# Patient Record
Sex: FEMALE | Race: BLACK OR AFRICAN AMERICAN | NOT HISPANIC OR LATINO | URBAN - METROPOLITAN AREA
[De-identification: names, ages, dates, MRNs, and addresses within clinical notes are randomized per-mention and may not be internally consistent; named-entity substitution may affect disease eponyms.]

---

## 2018-03-08 ENCOUNTER — OFFICE (OUTPATIENT)
Dept: URBAN - METROPOLITAN AREA CLINIC 75 | Facility: CLINIC | Age: 53
End: 2018-03-08
Payer: COMMERCIAL

## 2018-03-08 VITALS
DIASTOLIC BLOOD PRESSURE: 72 MMHG | HEART RATE: 78 BPM | WEIGHT: 121 LBS | HEIGHT: 68 IN | SYSTOLIC BLOOD PRESSURE: 100 MMHG

## 2018-03-08 DIAGNOSIS — Z12.11 ENCOUNTER FOR SCREENING FOR MALIGNANT NEOPLASM OF COLON: ICD-10-CM

## 2018-03-08 DIAGNOSIS — R63.0 ANOREXIA: ICD-10-CM

## 2018-03-08 DIAGNOSIS — R63.4 ABNORMAL WEIGHT LOSS: ICD-10-CM

## 2018-03-08 DIAGNOSIS — R10.13 EPIGASTRIC PAIN: ICD-10-CM

## 2018-03-08 DIAGNOSIS — K59.00 CONSTIPATION, UNSPECIFIED: ICD-10-CM

## 2018-03-08 PROCEDURE — 99204 OFFICE O/P NEW MOD 45 MIN: CPT | Performed by: INTERNAL MEDICINE

## 2018-03-08 NOTE — SERVICENOTES
I did review her records but I do not have her recent blood work.  According to records at one point she was empirically treated for strongyloides.

## 2018-03-08 NOTE — SERVICEHPINOTES
Thank you very much for allowing me to evaluate Ms. Sanders. As you know she is a 53-year-old refugee from [place]. The information is through an . She's had abdominal pain for at least 5 years. She'll have postprandial nausea and vomiting intermittently. She has no appetite, she "forces herself to eat to survive". She or have pain postprandially at times of the time she will have pain. She specifically says she'll have pain every time she drinks beer. She's had no abdominal surgeries. She's had postprandial nausea and vomiting at times. She apparently had been treated in the past with omeprazole, symptoms improved on the medication. She stopped it and eventually symptoms relapse. She was treated again but she says the second time the medication did not help. She says her siblings have similar symptoms and a lot of the problems started when she and her siblings lived in refugee camps before coming here. She is not a smoker. She is thin but she is in no acute distress.She also has constipation. She often has to strain. That's not new for her. There is no blood in the bowels. There is no lower abdominal pain. Family history is negative for polyps, colitis, colon cancer or liver disease. She does tell me she had lab work done a couple weeks ago. I don't have those results. She does not look acutely ill.

## 2018-04-27 ENCOUNTER — ON CAMPUS - OUTPATIENT (OUTPATIENT)
Dept: URBAN - METROPOLITAN AREA HOSPITAL 108 | Facility: HOSPITAL | Age: 53
End: 2018-04-27
Payer: COMMERCIAL

## 2018-04-27 DIAGNOSIS — K62.1 RECTAL POLYP: ICD-10-CM

## 2018-04-27 DIAGNOSIS — R63.0 ANOREXIA: ICD-10-CM

## 2018-04-27 DIAGNOSIS — R10.13 EPIGASTRIC PAIN: ICD-10-CM

## 2018-04-27 DIAGNOSIS — K29.50 UNSPECIFIED CHRONIC GASTRITIS WITHOUT BLEEDING: ICD-10-CM

## 2018-04-27 DIAGNOSIS — R63.4 ABNORMAL WEIGHT LOSS: ICD-10-CM

## 2018-04-27 DIAGNOSIS — Z12.11 ENCOUNTER FOR SCREENING FOR MALIGNANT NEOPLASM OF COLON: ICD-10-CM

## 2018-04-27 PROCEDURE — 45380 COLONOSCOPY AND BIOPSY: CPT | Mod: PT | Performed by: INTERNAL MEDICINE

## 2018-04-27 PROCEDURE — 43239 EGD BIOPSY SINGLE/MULTIPLE: CPT | Mod: 59 | Performed by: INTERNAL MEDICINE

## 2024-06-05 ENCOUNTER — INPATIENT HOSPITAL (OUTPATIENT)
Dept: URBAN - METROPOLITAN AREA HOSPITAL 107 | Facility: HOSPITAL | Age: 59
End: 2024-06-05
Payer: COMMERCIAL

## 2024-06-05 DIAGNOSIS — R13.10 DYSPHAGIA, UNSPECIFIED: ICD-10-CM

## 2024-06-05 DIAGNOSIS — K21.9 GASTRO-ESOPHAGEAL REFLUX DISEASE WITHOUT ESOPHAGITIS: ICD-10-CM

## 2024-06-05 DIAGNOSIS — R93.3 ABNORMAL FINDINGS ON DIAGNOSTIC IMAGING OF OTHER PARTS OF DI: ICD-10-CM

## 2024-06-05 PROCEDURE — 99222 1ST HOSP IP/OBS MODERATE 55: CPT | Performed by: INTERNAL MEDICINE

## 2024-06-06 ENCOUNTER — INPATIENT HOSPITAL (OUTPATIENT)
Dept: URBAN - METROPOLITAN AREA HOSPITAL 107 | Facility: HOSPITAL | Age: 59
End: 2024-06-06
Payer: COMMERCIAL

## 2024-06-06 DIAGNOSIS — R93.3 ABNORMAL FINDINGS ON DIAGNOSTIC IMAGING OF OTHER PARTS OF DI: ICD-10-CM

## 2024-06-06 DIAGNOSIS — K22.89 OTHER SPECIFIED DISEASE OF ESOPHAGUS: ICD-10-CM

## 2024-06-06 DIAGNOSIS — R13.10 DYSPHAGIA, UNSPECIFIED: ICD-10-CM

## 2024-06-06 DIAGNOSIS — K22.2 ESOPHAGEAL OBSTRUCTION: ICD-10-CM

## 2024-06-06 PROCEDURE — 43239 EGD BIOPSY SINGLE/MULTIPLE: CPT | Mod: 59 | Performed by: INTERNAL MEDICINE

## 2024-06-06 PROCEDURE — 43249 ESOPH EGD DILATION <30 MM: CPT | Performed by: INTERNAL MEDICINE

## 2024-06-07 ENCOUNTER — INPATIENT HOSPITAL (OUTPATIENT)
Dept: URBAN - METROPOLITAN AREA HOSPITAL 107 | Facility: HOSPITAL | Age: 59
End: 2024-06-07
Payer: COMMERCIAL

## 2024-06-07 DIAGNOSIS — R93.3 ABNORMAL FINDINGS ON DIAGNOSTIC IMAGING OF OTHER PARTS OF DI: ICD-10-CM

## 2024-06-07 DIAGNOSIS — R13.10 DYSPHAGIA, UNSPECIFIED: ICD-10-CM

## 2024-06-07 PROCEDURE — 99232 SBSQ HOSP IP/OBS MODERATE 35: CPT | Performed by: PHYSICIAN ASSISTANT
